# Patient Record
Sex: FEMALE | Race: WHITE | NOT HISPANIC OR LATINO | Employment: OTHER | URBAN - METROPOLITAN AREA
[De-identification: names, ages, dates, MRNs, and addresses within clinical notes are randomized per-mention and may not be internally consistent; named-entity substitution may affect disease eponyms.]

---

## 2017-12-03 ENCOUNTER — APPOINTMENT (OUTPATIENT)
Dept: RADIOLOGY | Facility: MEDICAL CENTER | Age: 82
DRG: 310 | End: 2017-12-03
Attending: FAMILY MEDICINE
Payer: MEDICARE

## 2017-12-03 ENCOUNTER — HOSPITAL ENCOUNTER (INPATIENT)
Facility: MEDICAL CENTER | Age: 82
LOS: 2 days | DRG: 310 | End: 2017-12-05
Attending: EMERGENCY MEDICINE | Admitting: FAMILY MEDICINE
Payer: MEDICARE

## 2017-12-03 ENCOUNTER — RESOLUTE PROFESSIONAL BILLING HOSPITAL PROF FEE (OUTPATIENT)
Dept: HOSPITALIST | Facility: MEDICAL CENTER | Age: 82
End: 2017-12-03
Payer: MEDICARE

## 2017-12-03 ENCOUNTER — APPOINTMENT (OUTPATIENT)
Dept: RADIOLOGY | Facility: MEDICAL CENTER | Age: 82
DRG: 310 | End: 2017-12-03
Attending: EMERGENCY MEDICINE
Payer: MEDICARE

## 2017-12-03 DIAGNOSIS — J81.0 ACUTE PULMONARY EDEMA (HCC): ICD-10-CM

## 2017-12-03 DIAGNOSIS — I48.91 ATRIAL FIBRILLATION WITH RVR (HCC): ICD-10-CM

## 2017-12-03 DIAGNOSIS — I48.91 RAPID ATRIAL FIBRILLATION (HCC): ICD-10-CM

## 2017-12-03 DIAGNOSIS — R53.1 WEAKNESS: ICD-10-CM

## 2017-12-03 LAB
ALBUMIN SERPL BCP-MCNC: 3.8 G/DL (ref 3.2–4.9)
ALBUMIN/GLOB SERPL: 1.5 G/DL
ALP SERPL-CCNC: 60 U/L (ref 30–99)
ALT SERPL-CCNC: 25 U/L (ref 2–50)
ANION GAP SERPL CALC-SCNC: 8 MMOL/L (ref 0–11.9)
APTT PPP: 28.2 SEC (ref 24.7–36)
AST SERPL-CCNC: 43 U/L (ref 12–45)
BASOPHILS # BLD AUTO: 1.2 % (ref 0–1.8)
BASOPHILS # BLD: 0.1 K/UL (ref 0–0.12)
BILIRUB SERPL-MCNC: 1.3 MG/DL (ref 0.1–1.5)
BNP SERPL-MCNC: 439 PG/ML (ref 0–100)
BUN SERPL-MCNC: 19 MG/DL (ref 8–22)
CALCIUM SERPL-MCNC: 8.8 MG/DL (ref 8.5–10.5)
CHLORIDE SERPL-SCNC: 106 MMOL/L (ref 96–112)
CO2 SERPL-SCNC: 25 MMOL/L (ref 20–33)
CREAT SERPL-MCNC: 0.78 MG/DL (ref 0.5–1.4)
EKG IMPRESSION: NORMAL
EOSINOPHIL # BLD AUTO: 0.07 K/UL (ref 0–0.51)
EOSINOPHIL NFR BLD: 0.8 % (ref 0–6.9)
ERYTHROCYTE [DISTWIDTH] IN BLOOD BY AUTOMATED COUNT: 50.4 FL (ref 35.9–50)
GFR SERPL CREATININE-BSD FRML MDRD: >60 ML/MIN/1.73 M 2
GLOBULIN SER CALC-MCNC: 2.6 G/DL (ref 1.9–3.5)
GLUCOSE SERPL-MCNC: 106 MG/DL (ref 65–99)
HCT VFR BLD AUTO: 37.7 % (ref 37–47)
HGB BLD-MCNC: 12.1 G/DL (ref 12–16)
IMM GRANULOCYTES # BLD AUTO: 0.04 K/UL (ref 0–0.11)
IMM GRANULOCYTES NFR BLD AUTO: 0.5 % (ref 0–0.9)
INR PPP: 1.07 (ref 0.87–1.13)
LV EJECT FRACT  99904: 65
LV EJECT FRACT MOD 2C 99903: 63.16
LV EJECT FRACT MOD 4C 99902: 47.4
LV EJECT FRACT MOD BP 99901: 56.26
LYMPHOCYTES # BLD AUTO: 1.49 K/UL (ref 1–4.8)
LYMPHOCYTES NFR BLD: 17.5 % (ref 22–41)
MAGNESIUM SERPL-MCNC: 1.9 MG/DL (ref 1.5–2.5)
MCH RBC QN AUTO: 31.2 PG (ref 27–33)
MCHC RBC AUTO-ENTMCNC: 32.1 G/DL (ref 33.6–35)
MCV RBC AUTO: 97.2 FL (ref 81.4–97.8)
MONOCYTES # BLD AUTO: 0.81 K/UL (ref 0–0.85)
MONOCYTES NFR BLD AUTO: 9.5 % (ref 0–13.4)
NEUTROPHILS # BLD AUTO: 5.99 K/UL (ref 2–7.15)
NEUTROPHILS NFR BLD: 70.5 % (ref 44–72)
NRBC # BLD AUTO: 0 K/UL
NRBC BLD AUTO-RTO: 0 /100 WBC
PLATELET # BLD AUTO: 258 K/UL (ref 164–446)
PMV BLD AUTO: 10.4 FL (ref 9–12.9)
POTASSIUM SERPL-SCNC: 3.8 MMOL/L (ref 3.6–5.5)
PROT SERPL-MCNC: 6.4 G/DL (ref 6–8.2)
PROTHROMBIN TIME: 13.6 SEC (ref 12–14.6)
RBC # BLD AUTO: 3.88 M/UL (ref 4.2–5.4)
SODIUM SERPL-SCNC: 139 MMOL/L (ref 135–145)
T4 FREE SERPL-MCNC: 1.33 NG/DL (ref 0.53–1.43)
TROPONIN I SERPL-MCNC: <0.01 NG/ML (ref 0–0.04)
TSH SERPL DL<=0.005 MIU/L-ACNC: 9.05 UIU/ML (ref 0.3–3.7)
WBC # BLD AUTO: 8.5 K/UL (ref 4.8–10.8)

## 2017-12-03 PROCEDURE — 700111 HCHG RX REV CODE 636 W/ 250 OVERRIDE (IP): Performed by: FAMILY MEDICINE

## 2017-12-03 PROCEDURE — 84439 ASSAY OF FREE THYROXINE: CPT

## 2017-12-03 PROCEDURE — 83880 ASSAY OF NATRIURETIC PEPTIDE: CPT

## 2017-12-03 PROCEDURE — 94760 N-INVAS EAR/PLS OXIMETRY 1: CPT

## 2017-12-03 PROCEDURE — 99285 EMERGENCY DEPT VISIT HI MDM: CPT

## 2017-12-03 PROCEDURE — 85610 PROTHROMBIN TIME: CPT

## 2017-12-03 PROCEDURE — 93306 TTE W/DOPPLER COMPLETE: CPT | Mod: 26 | Performed by: INTERNAL MEDICINE

## 2017-12-03 PROCEDURE — 93005 ELECTROCARDIOGRAM TRACING: CPT | Performed by: EMERGENCY MEDICINE

## 2017-12-03 PROCEDURE — 770020 HCHG ROOM/CARE - TELE (206)

## 2017-12-03 PROCEDURE — 700111 HCHG RX REV CODE 636 W/ 250 OVERRIDE (IP): Performed by: HOSPITALIST

## 2017-12-03 PROCEDURE — 85025 COMPLETE CBC W/AUTO DIFF WBC: CPT

## 2017-12-03 PROCEDURE — 83735 ASSAY OF MAGNESIUM: CPT

## 2017-12-03 PROCEDURE — 700111 HCHG RX REV CODE 636 W/ 250 OVERRIDE (IP): Performed by: INTERNAL MEDICINE

## 2017-12-03 PROCEDURE — A9270 NON-COVERED ITEM OR SERVICE: HCPCS | Performed by: INTERNAL MEDICINE

## 2017-12-03 PROCEDURE — 84484 ASSAY OF TROPONIN QUANT: CPT

## 2017-12-03 PROCEDURE — 84443 ASSAY THYROID STIM HORMONE: CPT

## 2017-12-03 PROCEDURE — 71275 CT ANGIOGRAPHY CHEST: CPT

## 2017-12-03 PROCEDURE — 96376 TX/PRO/DX INJ SAME DRUG ADON: CPT

## 2017-12-03 PROCEDURE — 96375 TX/PRO/DX INJ NEW DRUG ADDON: CPT

## 2017-12-03 PROCEDURE — 700102 HCHG RX REV CODE 250 W/ 637 OVERRIDE(OP): Performed by: FAMILY MEDICINE

## 2017-12-03 PROCEDURE — A9270 NON-COVERED ITEM OR SERVICE: HCPCS | Performed by: FAMILY MEDICINE

## 2017-12-03 PROCEDURE — 700111 HCHG RX REV CODE 636 W/ 250 OVERRIDE (IP): Performed by: EMERGENCY MEDICINE

## 2017-12-03 PROCEDURE — 96374 THER/PROPH/DIAG INJ IV PUSH: CPT

## 2017-12-03 PROCEDURE — 700111 HCHG RX REV CODE 636 W/ 250 OVERRIDE (IP): Performed by: NURSE PRACTITIONER

## 2017-12-03 PROCEDURE — 700117 HCHG RX CONTRAST REV CODE 255: Performed by: FAMILY MEDICINE

## 2017-12-03 PROCEDURE — 93306 TTE W/DOPPLER COMPLETE: CPT

## 2017-12-03 PROCEDURE — 80053 COMPREHEN METABOLIC PANEL: CPT

## 2017-12-03 PROCEDURE — 700101 HCHG RX REV CODE 250: Performed by: FAMILY MEDICINE

## 2017-12-03 PROCEDURE — 93005 ELECTROCARDIOGRAM TRACING: CPT

## 2017-12-03 PROCEDURE — 71010 DX-CHEST-PORTABLE (1 VIEW): CPT

## 2017-12-03 PROCEDURE — 700102 HCHG RX REV CODE 250 W/ 637 OVERRIDE(OP): Performed by: INTERNAL MEDICINE

## 2017-12-03 PROCEDURE — 99221 1ST HOSP IP/OBS SF/LOW 40: CPT | Mod: AI | Performed by: FAMILY MEDICINE

## 2017-12-03 PROCEDURE — 85730 THROMBOPLASTIN TIME PARTIAL: CPT

## 2017-12-03 RX ORDER — POLYETHYLENE GLYCOL 3350 17 G/17G
1 POWDER, FOR SOLUTION ORAL
Status: DISCONTINUED | OUTPATIENT
Start: 2017-12-03 | End: 2017-12-05 | Stop reason: HOSPADM

## 2017-12-03 RX ORDER — ONDANSETRON 4 MG/1
4 TABLET, ORALLY DISINTEGRATING ORAL EVERY 4 HOURS PRN
Status: DISCONTINUED | OUTPATIENT
Start: 2017-12-03 | End: 2017-12-05 | Stop reason: HOSPADM

## 2017-12-03 RX ORDER — LEVOTHYROXINE SODIUM 0.07 MG/1
75 TABLET ORAL
Status: DISCONTINUED | OUTPATIENT
Start: 2017-12-04 | End: 2017-12-05 | Stop reason: HOSPADM

## 2017-12-03 RX ORDER — PAROXETINE 10 MG/1
10 TABLET, FILM COATED ORAL DAILY
Status: DISCONTINUED | OUTPATIENT
Start: 2017-12-03 | End: 2017-12-05 | Stop reason: HOSPADM

## 2017-12-03 RX ORDER — LEVOTHYROXINE SODIUM 0.07 MG/1
75 TABLET ORAL
COMMUNITY

## 2017-12-03 RX ORDER — FUROSEMIDE 10 MG/ML
20 INJECTION INTRAMUSCULAR; INTRAVENOUS ONCE
Status: COMPLETED | OUTPATIENT
Start: 2017-12-03 | End: 2017-12-03

## 2017-12-03 RX ORDER — OXYCODONE HYDROCHLORIDE 5 MG/1
2.5 TABLET ORAL
Status: DISCONTINUED | OUTPATIENT
Start: 2017-12-03 | End: 2017-12-05 | Stop reason: HOSPADM

## 2017-12-03 RX ORDER — PAROXETINE 10 MG/1
10 TABLET, FILM COATED ORAL DAILY
COMMUNITY

## 2017-12-03 RX ORDER — DILTIAZEM HYDROCHLORIDE 5 MG/ML
10 INJECTION INTRAVENOUS ONCE
Status: COMPLETED | OUTPATIENT
Start: 2017-12-03 | End: 2017-12-03

## 2017-12-03 RX ORDER — DIGOXIN 0.25 MG/ML
250 INJECTION INTRAMUSCULAR; INTRAVENOUS
Status: DISCONTINUED | OUTPATIENT
Start: 2017-12-03 | End: 2017-12-03

## 2017-12-03 RX ORDER — ACETAMINOPHEN 325 MG/1
650 TABLET ORAL EVERY 6 HOURS PRN
Status: DISCONTINUED | OUTPATIENT
Start: 2017-12-03 | End: 2017-12-05 | Stop reason: HOSPADM

## 2017-12-03 RX ORDER — METOPROLOL TARTRATE 1 MG/ML
5 INJECTION, SOLUTION INTRAVENOUS
Status: DISCONTINUED | OUTPATIENT
Start: 2017-12-03 | End: 2017-12-05 | Stop reason: HOSPADM

## 2017-12-03 RX ORDER — DIGOXIN 0.25 MG/ML
250 INJECTION INTRAMUSCULAR; INTRAVENOUS ONCE
Status: COMPLETED | OUTPATIENT
Start: 2017-12-03 | End: 2017-12-03

## 2017-12-03 RX ORDER — DIGOXIN 125 MCG
125 TABLET ORAL DAILY
Status: DISCONTINUED | OUTPATIENT
Start: 2017-12-04 | End: 2017-12-05 | Stop reason: HOSPADM

## 2017-12-03 RX ORDER — CHLORAL HYDRATE 500 MG
1 CAPSULE ORAL DAILY
COMMUNITY

## 2017-12-03 RX ORDER — DIGOXIN 0.25 MG/ML
250 INJECTION INTRAMUSCULAR; INTRAVENOUS 3 TIMES DAILY PRN
Status: DISCONTINUED | OUTPATIENT
Start: 2017-12-03 | End: 2017-12-05 | Stop reason: HOSPADM

## 2017-12-03 RX ORDER — BISACODYL 10 MG
10 SUPPOSITORY, RECTAL RECTAL
Status: DISCONTINUED | OUTPATIENT
Start: 2017-12-03 | End: 2017-12-05 | Stop reason: HOSPADM

## 2017-12-03 RX ORDER — POTASSIUM CHLORIDE 20 MEQ/1
40 TABLET, EXTENDED RELEASE ORAL ONCE
Status: COMPLETED | OUTPATIENT
Start: 2017-12-03 | End: 2017-12-03

## 2017-12-03 RX ORDER — MORPHINE SULFATE 4 MG/ML
2 INJECTION, SOLUTION INTRAMUSCULAR; INTRAVENOUS
Status: DISCONTINUED | OUTPATIENT
Start: 2017-12-03 | End: 2017-12-05 | Stop reason: HOSPADM

## 2017-12-03 RX ORDER — CALCIUM CARBONATE 500(1250)
500 TABLET ORAL DAILY
COMMUNITY

## 2017-12-03 RX ORDER — AMOXICILLIN 250 MG
2 CAPSULE ORAL 2 TIMES DAILY
Status: DISCONTINUED | OUTPATIENT
Start: 2017-12-03 | End: 2017-12-05 | Stop reason: HOSPADM

## 2017-12-03 RX ORDER — ONDANSETRON 2 MG/ML
4 INJECTION INTRAMUSCULAR; INTRAVENOUS EVERY 4 HOURS PRN
Status: DISCONTINUED | OUTPATIENT
Start: 2017-12-03 | End: 2017-12-05 | Stop reason: HOSPADM

## 2017-12-03 RX ORDER — BIOTIN 10 MG
1 TABLET ORAL DAILY
COMMUNITY

## 2017-12-03 RX ORDER — OXYCODONE HYDROCHLORIDE 5 MG/1
5 TABLET ORAL
Status: DISCONTINUED | OUTPATIENT
Start: 2017-12-03 | End: 2017-12-05 | Stop reason: HOSPADM

## 2017-12-03 RX ADMIN — DIGOXIN 250 MCG: 0.25 INJECTION INTRAMUSCULAR; INTRAVENOUS at 13:54

## 2017-12-03 RX ADMIN — ENOXAPARIN SODIUM 80 MG: 100 INJECTION SUBCUTANEOUS at 15:51

## 2017-12-03 RX ADMIN — DILTIAZEM HYDROCHLORIDE 10 MG: 5 INJECTION INTRAVENOUS at 11:39

## 2017-12-03 RX ADMIN — IOHEXOL 65 ML: 350 INJECTION, SOLUTION INTRAVENOUS at 19:41

## 2017-12-03 RX ADMIN — POTASSIUM CHLORIDE 40 MEQ: 1500 TABLET, EXTENDED RELEASE ORAL at 21:29

## 2017-12-03 RX ADMIN — DILTIAZEM HYDROCHLORIDE 10 MG: 5 INJECTION INTRAVENOUS at 10:39

## 2017-12-03 RX ADMIN — DILTIAZEM HYDROCHLORIDE 60 MG: 30 TABLET, FILM COATED ORAL at 17:12

## 2017-12-03 RX ADMIN — DIGOXIN 250 MCG: 0.25 INJECTION INTRAMUSCULAR; INTRAVENOUS at 18:14

## 2017-12-03 RX ADMIN — FUROSEMIDE 20 MG: 10 INJECTION, SOLUTION INTRAMUSCULAR; INTRAVENOUS at 21:29

## 2017-12-03 RX ADMIN — DIGOXIN 250 MCG: 0.25 INJECTION INTRAMUSCULAR; INTRAVENOUS at 22:29

## 2017-12-03 RX ADMIN — RIVAROXABAN 20 MG: 20 TABLET, FILM COATED ORAL at 17:12

## 2017-12-03 RX ADMIN — METOPROLOL TARTRATE 5 MG: 5 INJECTION, SOLUTION INTRAVENOUS at 18:55

## 2017-12-03 RX ADMIN — PAROXETINE HYDROCHLORIDE 10 MG: 10 TABLET, FILM COATED ORAL at 21:29

## 2017-12-03 RX ADMIN — METOPROLOL TARTRATE 5 MG: 5 INJECTION, SOLUTION INTRAVENOUS at 14:29

## 2017-12-03 ASSESSMENT — PATIENT HEALTH QUESTIONNAIRE - PHQ9
SUM OF ALL RESPONSES TO PHQ9 QUESTIONS 1 AND 2: 0
2. FEELING DOWN, DEPRESSED, IRRITABLE, OR HOPELESS: NOT AT ALL
SUM OF ALL RESPONSES TO PHQ QUESTIONS 1-9: 0
1. LITTLE INTEREST OR PLEASURE IN DOING THINGS: NOT AT ALL

## 2017-12-03 ASSESSMENT — COGNITIVE AND FUNCTIONAL STATUS - GENERAL
DAILY ACTIVITIY SCORE: 24
SUGGESTED CMS G CODE MODIFIER DAILY ACTIVITY: CH
CLIMB 3 TO 5 STEPS WITH RAILING: A LITTLE
SUGGESTED CMS G CODE MODIFIER MOBILITY: CI
MOBILITY SCORE: 23

## 2017-12-03 ASSESSMENT — PAIN SCALES - GENERAL
PAINLEVEL_OUTOF10: 0

## 2017-12-03 ASSESSMENT — LIFESTYLE VARIABLES
TOTAL SCORE: 0
EVER HAD A DRINK FIRST THING IN THE MORNING TO STEADY YOUR NERVES TO GET RID OF A HANGOVER: NO
TOTAL SCORE: 0
TOTAL SCORE: 0
CONSUMPTION TOTAL: NEGATIVE
EVER_SMOKED: YES
EVER FELT BAD OR GUILTY ABOUT YOUR DRINKING: NO
HOW MANY TIMES IN THE PAST YEAR HAVE YOU HAD 5 OR MORE DRINKS IN A DAY: 0
ON A TYPICAL DAY WHEN YOU DRINK ALCOHOL HOW MANY DRINKS DO YOU HAVE: 1
AVERAGE NUMBER OF DAYS PER WEEK YOU HAVE A DRINK CONTAINING ALCOHOL: 1
HAVE PEOPLE ANNOYED YOU BY CRITICIZING YOUR DRINKING: NO
HAVE YOU EVER FELT YOU SHOULD CUT DOWN ON YOUR DRINKING: NO
ALCOHOL_USE: YES

## 2017-12-03 NOTE — PROGRESS NOTES
Patient up from ED, report received. Patient ambulated from rney to bed tolerated well, slightly DSOUZA. Patient placed on tele monitor, monitor room notified. Vital signs obtained, HR elevated. Physical assessment performed. Patient is A&O X 4, declines pain at this time. Patient oriented to room and unit routine. Admit profile complete. Patient declines further needs at this time. Will continue to monitor for safety and comfort.

## 2017-12-03 NOTE — H&P
DOS: 12/3/2017    PATIENT ID:  NAME:  Valerie Ulloa  MRN:               [id number]  YOB: 1932    PMD: No primary care provider on file.    CC: Generalized weakness    HPI: Valerie Ulloa is a 85 y.o. female who presents with generalized weakness and fatigue for the past 3 days. She flew in from [place] to [place] and doing the pelvic bowel dilatation her son so 50 of birthday. He has a house there. She denies having chest pain or palpitations shortness of breath. Denies any fevers chills or cough or congestion nausea vomiting diarrhea. Her symptoms of weakness and fatigue were persistent and there was supposed to fly out today they were ready in the airport when she got more week. She was brought to emergency room she is known to be atrial fibrillation with rapid ventricular rate with a rate in the 150s. She did use denies any chest pains or palpitations. She states she had an episode of atrial fibrillation for 5 years ago was very transient she was not placed on any rhythm or rate control medications or any anticoagulation since this was just one episode. She'll be given IV diltiazem here twice on her rate still in the 130s, cardiology Dr. Crane has been consulted have discussed the case with her. Plan is to give her some IV digoxin to see if this will control her rate.                REVIEW OF SYSTEMS  A full review of systems was completed and all pertinent positives and negatives are included in the HPI above by AMA/CMS criteria.    PAST MEDICAL HISTORY  Past Medical History:   Diagnosis Date   • Anxiety    • Hypothyroid    • Osteoporosis    • PAF (paroxysmal atrial fibrillation) (CMS-HCC)        PAST SURGICAL HISTORY  No past surgical history on file.    FAMILY HISTORY  No family history on file.    SOCIAL HISTORY  Social History   Substance Use Topics   • Smoking status: Never Smoker   • Smokeless tobacco: Never Used   • Alcohol use No       ALLERGIES  Allergies as of 12/03/2017 -  "Reviewed 12/03/2017   Allergen Reaction Noted   • Pcn [penicillins] Rash 12/03/2017       OUTPATIENT MEDICATIONS     Medication List      ASK your doctor about these medications      Instructions   Biotin 10 MG Tabs   Take 1 Tab by mouth every day.  Dose:  1 Tab     BONIVA IV   by Intravenous route.     calcium carbonate 500 MG Tabs  Commonly known as:  OS-SIRIA 500   Take 500 mg by mouth every day.  Dose:  500 mg     levothyroxine 75 MCG Tabs  Commonly known as:  SYNTHROID   Take 75 mcg by mouth Every morning on an empty stomach.  Dose:  75 mcg     Omega-3 1000 MG Caps   Take 1 Cap by mouth every day.  Dose:  1 Cap     paroxetine 10 MG Tabs  Commonly known as:  PAXIL   Take 10 mg by mouth every day.  Dose:  10 mg            PHYSICAL EXAM:  Pulse (!) 117, temperature 36.9 °C (98.4 °F), resp. rate 16, height 1.6 m (5' 3\"), weight 76.7 kg (169 lb 1.5 oz), SpO2 93 %.  Oxygen: Pulse Oximetry: 93 %, O2 (LPM): 2, O2 Delivery: Nasal Cannula    Gen: NAD, comfortable  HEENT: NCAT, PERRL, EOMI, Oropharynx moist and clear, no LAD, no JVD, neck supple  Chest: no accessory muscle use, CTAB  CV: irregular rate and rhythm, S1 and S2 distinct, no murmur  GI: +BS, soft, NT, ND, no rebound/guarding, no hepatosplenomegaly  Extremities: Warm, well-perfused, no cyanosis/clubbing, no peripheral edema, distal pulses are intact  Neuro: AO x 3, CN II-XII grossly intact, MMT 5/5, no sensory deficits    LAB TESTS and IMAGES:   Recent Labs      12/03/17   1009   WBC  8.5   RBC  3.88*   HEMOGLOBIN  12.1   HEMATOCRIT  37.7   MCV  97.2   MCH  31.2   RDW  50.4*   PLATELETCT  258   MPV  10.4   NEUTSPOLYS  70.50   LYMPHOCYTES  17.50*   MONOCYTES  9.50   EOSINOPHILS  0.80   BASOPHILS  1.20     Recent Labs      12/03/17   1009   TROPONINI  <0.01   BNPBTYPENAT  439*     Recent Labs      12/03/17   1009   APTT  28.2   INR  1.07     Recent Labs      12/03/17   1009   SODIUM  139   POTASSIUM  3.8   CHLORIDE  106   CO2  25   BUN  19   CREATININE  0.78 "   CALCIUM  8.8   ALBUMIN  3.8     Estimated GFR/CRCL = Estimated Creatinine Clearance: 51.7 mL/min (by C-G formula based on SCr of 0.78 mg/dL).  Recent Labs      12/03/17   1009   GLUCOSE  106*     Free T-4   Date/Time Value Ref Range Status   12/03/2017 10:09 AM 1.33 0.53 - 1.43 ng/dL Final     Recent Labs      12/03/17   1009   ASTSGOT  43   ALTSGPT  25   TBILIRUBIN  1.3   ALKPHOSPHAT  60   GLOBULIN  2.6   INR  1.07           Invalid input(s): [id number]  No results found for: [id number], FOLATE, FERRITIN, IRON, TOTIRONBC  Dx-chest-portable (1 View)    Result Date: 12/3/2017  12/3/2017 10:39 AM HISTORY/REASON FOR EXAM:  Shortness of Breath TECHNIQUE/EXAM DESCRIPTION AND NUMBER OF VIEWS: Single portable view of the chest. COMPARISON: None available. FINDINGS: Lungs are hyperinflated. The mediastinal and cardiac silhouette is mildly enlarged. The pulmonary vascularity is within normal limits. There is diffuse interstitial prominence with bronchial wall thickening. There is no significant pleural effusion. There is no visible pneumothorax. There are no acute bony abnormalities.     1.  Enlarged cardiac silhouette with diffuse interstitial prominence suggesting interstitial edema. There may be some superimposed chronic lung disease.      ASSESSMENT/PLAN:     1.  Atrial fibrillation rapid ventricular rate. IV diltiazem × 2 have been given in ED rate still in the 130s, IV digoxin will be tried, also put on oral Cardizem, IV metoprolol as needed, start her on Lovenox full dose twice daily, check echocardiogram, cardiology Dr. Crane will see the patient.  2.  Hypothyroid. Continue levothyroxine, will would not increase her dose for now given her TSH is somewhat elevated  3.  Anxiety. Continue Paxil  4.  Elevated BNP. Check Echocardiogram    PPX: Lovenox    CODE STATUS: FULL    Anticipate that patient will need more than 2 midnights for management of the above discussed medical issues.    This dictation was created  using voice recognition software. The accuracy of the dictation is limited to the abilities of the software. I expect there may be some errors of grammar and possibly content.

## 2017-12-03 NOTE — ED PROVIDER NOTES
ED Provider Note    CHIEF COMPLAINT  Chief Complaint   Patient presents with   • Atrial Fibrillation   • Shortness of Breath       HPI  Valerie Ulloa is a 85 y.o. female who presentsFeeling short of breath and generally weak.  She was going to the airport today, she felt unsteady and presents to the emergency department.  She has had one previous episode of atrial fibrillation several years ago but never required treatment as it spontaneously resolved prior to needing treatment.  She came here for vacation 4 days ago and has felt short of breath and lightheaded since, however it worsened today.  No chest pain.  No syncope.  No acute leg swelling.  Patient was planning to fly home to [facility] today but came to the hospital instead when she felt sicker.    REVIEW OF SYSTEMS    Constitutional: Generalized weakness, no fever  Respiratory: Shortness of breath with exertion  Cardiac: No palpitations or chest pain  Gastrointestinal: No abdominal pain, no nausea or vomiting  Musculoskeletal: No acute neck or back pain  Neurologic: No headache       All other systems are negative.       PAST MEDICAL HISTORY  Past Medical History:   Diagnosis Date   • Anxiety    • Hypothyroid    • Osteoporosis    • PAF (paroxysmal atrial fibrillation) (CMS-[facility])        FAMILY HISTORY  No family history on file.    SOCIAL HISTORY  Social History     Social History   • Marital status:      Spouse name: N/A   • Number of children: N/A   • Years of education: N/A     Social History Main Topics   • Smoking status: Never Smoker   • Smokeless tobacco: Never Used   • Alcohol use No   • Drug use: No   • Sexual activity: Not on file     Other Topics Concern   • Not on file     Social History Narrative   • No narrative on file       SURGICAL HISTORY  No past surgical history on file.    CURRENT MEDICATIONS  Home Medications     Reviewed by Raegan Prado, Pharmacy Intern (Pharmacy Intern) on 12/03/17 at 1200  Med List  "Status: Complete   Medication Last Dose Status   Biotin 10 MG Tab 12/2/2017 Active   calcium carbonate (CALCIUM CARBONATE) 500 MG Tab 12/2/2017 Active   Ibandronate Sodium (BONIVA IV) 11/19/2017 Active   levothyroxine (SYNTHROID) 75 MCG Tab 12/3/2017 Active   Omega-3 1000 MG Cap 12/2/2017 Active   paroxetine (PAXIL) 10 MG Tab 12/2/2017 Active                ALLERGIES  Allergies   Allergen Reactions   • Pcn [Penicillins] Rash     rash       PHYSICAL EXAM  VITAL SIGNS: Pulse (!) 117   Temp 36.9 °C (98.4 °F)   Resp 16   Ht 1.6 m (5' 3\")   Wt 76.7 kg (169 lb 1.5 oz)   SpO2 93%   BMI 29.95 kg/m²   Constitutional:  Non-toxic appearance.   HENT: No facial swelling  Eyes: Anicteric, no conjunctivitis.     Cardiovascular:Tachycardic heart rate, irregular rhythm  Pulmonary:  No wheezing, No rales.   Gastrointestinal: Soft, No tenderness, No masses  Skin: Warm, Dry, No cyanosis.   Neurologic: Speech is clear, follows commands, facial expression is symmetrical.  Sensation and strength normal  Psychiatric: Affect normal,  Mood normal.  Patient is calm and cooperative  Musculoskeletal: No chest wall tenderness.    EKG/Labs  Results for orders placed or performed during the hospital encounter of 12/03/17   CBC WITH DIFFERENTIAL   Result Value Ref Range    WBC 8.5 4.8 - 10.8 K/uL    RBC 3.88 (L) 4.20 - 5.40 M/uL    Hemoglobin 12.1 12.0 - 16.0 g/dL    Hematocrit 37.7 37.0 - 47.0 %    MCV 97.2 81.4 - 97.8 fL    MCH 31.2 27.0 - 33.0 pg    MCHC 32.1 (L) 33.6 - 35.0 g/dL    RDW 50.4 (H) 35.9 - 50.0 fL    Platelet Count 258 164 - 446 K/uL    MPV 10.4 9.0 - 12.9 fL    Neutrophils-Polys 70.50 44.00 - 72.00 %    Lymphocytes 17.50 (L) 22.00 - 41.00 %    Monocytes 9.50 0.00 - 13.40 %    Eosinophils 0.80 0.00 - 6.90 %    Basophils 1.20 0.00 - 1.80 %    Immature Granulocytes 0.50 0.00 - 0.90 %    Nucleated RBC 0.00 /100 WBC    Neutrophils (Absolute) 5.99 2.00 - 7.15 K/uL    Lymphs (Absolute) 1.49 1.00 - 4.80 K/uL    Monos (Absolute) 0.81 " 0.00 - 0.85 K/uL    Eos (Absolute) 0.07 0.00 - 0.51 K/uL    Baso (Absolute) 0.10 0.00 - 0.12 K/uL    Immature Granulocytes (abs) 0.04 0.00 - 0.11 K/uL    NRBC (Absolute) 0.00 K/uL   COMP METABOLIC PANEL   Result Value Ref Range    Sodium 139 135 - 145 mmol/L    Potassium 3.8 3.6 - 5.5 mmol/L    Chloride 106 96 - 112 mmol/L    Co2 25 20 - 33 mmol/L    Anion Gap 8.0 0.0 - 11.9    Glucose 106 (H) 65 - 99 mg/dL    Bun 19 8 - 22 mg/dL    Creatinine 0.78 0.50 - 1.40 mg/dL    Calcium 8.8 8.5 - 10.5 mg/dL    AST(SGOT) 43 12 - 45 U/L    ALT(SGPT) 25 2 - 50 U/L    Alkaline Phosphatase 60 30 - 99 U/L    Total Bilirubin 1.3 0.1 - 1.5 mg/dL    Albumin 3.8 3.2 - 4.9 g/dL    Total Protein 6.4 6.0 - 8.2 g/dL    Globulin 2.6 1.9 - 3.5 g/dL    A-G Ratio 1.5 g/dL   APTT   Result Value Ref Range    APTT 28.2 24.7 - 36.0 sec   PROTHROMBIN TIME   Result Value Ref Range    PT 13.6 12.0 - 14.6 sec    INR 1.07 0.87 - 1.13   TROPONIN   Result Value Ref Range    Troponin I <0.01 0.00 - 0.04 ng/mL   BTYPE NATRIURETIC PEPTIDE   Result Value Ref Range    B Natriuretic Peptide 439 (H) 0 - 100 pg/mL   TSH   Result Value Ref Range    TSH 9.050 (H) 0.300 - 3.700 uIU/mL   FREE THYROXINE   Result Value Ref Range    Free T-4 1.33 0.53 - 1.43 ng/dL   ESTIMATED GFR   Result Value Ref Range    GFR If African American >60 >60 mL/min/1.73 m 2    GFR If Non African American >60 >60 mL/min/1.73 m 2   EKG (ER)   Result Value Ref Range    Report       [facility] Emergency Dept.    Test Date:  2017  Pt Name:    LASHONDA MCCULLOUGH             Department: ER  MRN:        [id number]                      Room:       RD 10  Gender:     F                            Technician: [id number]  :        [id number]                   Requested By:ER TRIAGE PROTOCOL  Order #:    [id number]                    Reading MD: SCOTT ALDRICH MD    Measurements  Intervals                                Axis  Rate:       146                          P:  WA:                                       QRS:        -28  QRSD:       80                           T:          46  QT:         316  QTc:        493    Interpretive Statements  ATRIAL FIBRILLATION  BORDERLINE LEFT AXIS DEVIATION  BORDERLINE PROLONGED QT INTERVAL  No previous ECG available for comparison    Electronically Signed On 12-3-2017 15:16:06 PST by JOSEPH ALDRICH MD           RADIOLOGY/PROCEDURES  DX-CHEST-PORTABLE (1 VIEW)   Final Result      1.  Enlarged cardiac silhouette with diffuse interstitial prominence suggesting interstitial edema. There may be some superimposed chronic lung disease.      ECHOCARDIOGRAM COMP W/O CONT    (Results Pending)         COURSE & MEDICAL DECISION MAKING  Pertinent Labs & Imaging studies reviewed. (See chart for details)  X-ray as well as elevated BNP suggests some element of congestive heart failure which is the likely etiology of her shortness of breath.  Her troponin is negative.  Patient received IV diltiazem in the emergency department twice with some reduction in heart rate.  Cardiology has been consulted.  Patient is admitted for ongoing workup and medical stabilization    FINAL IMPRESSION     1. Rapid atrial fibrillation (CMS-HCC)    2. Weakness    3. Acute pulmonary edema (CMS-HCC)         Critical care time 32 minutes           Electronically signed by: Joseph Aldrich, 12/3/2017 3:16 PM

## 2017-12-03 NOTE — ED NOTES
Pt bib ems from airport. Pt flying back to [place]. Pt SOB, hx of afib but not on meds and only 1 episode in the past of afib. Pt currently in afib.

## 2017-12-03 NOTE — PROGRESS NOTES
2 RN skin check. Bilateral lower extremity swelling, bilateral feet dry, flaky. Skin is otherwise intact.

## 2017-12-04 ENCOUNTER — PATIENT OUTREACH (OUTPATIENT)
Dept: HEALTH INFORMATION MANAGEMENT | Facility: OTHER | Age: 82
End: 2017-12-04

## 2017-12-04 PROBLEM — R79.89 ELEVATED BRAIN NATRIURETIC PEPTIDE (BNP) LEVEL: Status: ACTIVE | Noted: 2017-12-04

## 2017-12-04 PROBLEM — R79.89 ELEVATED BRAIN NATRIURETIC PEPTIDE (BNP) LEVEL: Status: RESOLVED | Noted: 2017-12-04 | Resolved: 2017-12-04

## 2017-12-04 PROBLEM — E03.9 HYPOTHYROID: Status: ACTIVE | Noted: 2017-12-04

## 2017-12-04 PROBLEM — F41.9 ANXIETY: Status: ACTIVE | Noted: 2017-12-04

## 2017-12-04 LAB
ANION GAP SERPL CALC-SCNC: 8 MMOL/L (ref 0–11.9)
BASOPHILS # BLD AUTO: 0.8 % (ref 0–1.8)
BASOPHILS # BLD: 0.08 K/UL (ref 0–0.12)
BUN SERPL-MCNC: 15 MG/DL (ref 8–22)
CALCIUM SERPL-MCNC: 9 MG/DL (ref 8.5–10.5)
CHLORIDE SERPL-SCNC: 105 MMOL/L (ref 96–112)
CO2 SERPL-SCNC: 27 MMOL/L (ref 20–33)
CREAT SERPL-MCNC: 0.74 MG/DL (ref 0.5–1.4)
EKG IMPRESSION: NORMAL
EOSINOPHIL # BLD AUTO: 0.02 K/UL (ref 0–0.51)
EOSINOPHIL NFR BLD: 0.2 % (ref 0–6.9)
ERYTHROCYTE [DISTWIDTH] IN BLOOD BY AUTOMATED COUNT: 49.5 FL (ref 35.9–50)
GFR SERPL CREATININE-BSD FRML MDRD: >60 ML/MIN/1.73 M 2
GLUCOSE SERPL-MCNC: 110 MG/DL (ref 65–99)
HCT VFR BLD AUTO: 37.9 % (ref 37–47)
HGB BLD-MCNC: 11.9 G/DL (ref 12–16)
IMM GRANULOCYTES # BLD AUTO: 0.04 K/UL (ref 0–0.11)
IMM GRANULOCYTES NFR BLD AUTO: 0.4 % (ref 0–0.9)
LYMPHOCYTES # BLD AUTO: 1.32 K/UL (ref 1–4.8)
LYMPHOCYTES NFR BLD: 13.3 % (ref 22–41)
MCH RBC QN AUTO: 30.4 PG (ref 27–33)
MCHC RBC AUTO-ENTMCNC: 31.4 G/DL (ref 33.6–35)
MCV RBC AUTO: 96.7 FL (ref 81.4–97.8)
MONOCYTES # BLD AUTO: 0.95 K/UL (ref 0–0.85)
MONOCYTES NFR BLD AUTO: 9.6 % (ref 0–13.4)
NEUTROPHILS # BLD AUTO: 7.52 K/UL (ref 2–7.15)
NEUTROPHILS NFR BLD: 75.7 % (ref 44–72)
NRBC # BLD AUTO: 0 K/UL
NRBC BLD AUTO-RTO: 0 /100 WBC
PLATELET # BLD AUTO: 248 K/UL (ref 164–446)
PMV BLD AUTO: 10.7 FL (ref 9–12.9)
POTASSIUM SERPL-SCNC: 4.2 MMOL/L (ref 3.6–5.5)
RBC # BLD AUTO: 3.92 M/UL (ref 4.2–5.4)
SODIUM SERPL-SCNC: 140 MMOL/L (ref 135–145)
WBC # BLD AUTO: 9.9 K/UL (ref 4.8–10.8)

## 2017-12-04 PROCEDURE — A9270 NON-COVERED ITEM OR SERVICE: HCPCS | Performed by: INTERNAL MEDICINE

## 2017-12-04 PROCEDURE — A9270 NON-COVERED ITEM OR SERVICE: HCPCS | Performed by: FAMILY MEDICINE

## 2017-12-04 PROCEDURE — 93010 ELECTROCARDIOGRAM REPORT: CPT | Performed by: INTERNAL MEDICINE

## 2017-12-04 PROCEDURE — 700111 HCHG RX REV CODE 636 W/ 250 OVERRIDE (IP): Performed by: NURSE PRACTITIONER

## 2017-12-04 PROCEDURE — 85025 COMPLETE CBC W/AUTO DIFF WBC: CPT

## 2017-12-04 PROCEDURE — A9270 NON-COVERED ITEM OR SERVICE: HCPCS | Performed by: NURSE PRACTITIONER

## 2017-12-04 PROCEDURE — 700102 HCHG RX REV CODE 250 W/ 637 OVERRIDE(OP): Performed by: INTERNAL MEDICINE

## 2017-12-04 PROCEDURE — 770020 HCHG ROOM/CARE - TELE (206)

## 2017-12-04 PROCEDURE — 93005 ELECTROCARDIOGRAM TRACING: CPT | Performed by: INTERNAL MEDICINE

## 2017-12-04 PROCEDURE — 80048 BASIC METABOLIC PNL TOTAL CA: CPT

## 2017-12-04 PROCEDURE — 36415 COLL VENOUS BLD VENIPUNCTURE: CPT

## 2017-12-04 PROCEDURE — 700102 HCHG RX REV CODE 250 W/ 637 OVERRIDE(OP): Performed by: NURSE PRACTITIONER

## 2017-12-04 PROCEDURE — 99239 HOSP IP/OBS DSCHRG MGMT >30: CPT | Performed by: HOSPITALIST

## 2017-12-04 PROCEDURE — 700102 HCHG RX REV CODE 250 W/ 637 OVERRIDE(OP): Performed by: FAMILY MEDICINE

## 2017-12-04 RX ORDER — FUROSEMIDE 10 MG/ML
20 INJECTION INTRAMUSCULAR; INTRAVENOUS ONCE
Status: COMPLETED | OUTPATIENT
Start: 2017-12-04 | End: 2017-12-04

## 2017-12-04 RX ORDER — DIGOXIN 125 MCG
125 TABLET ORAL DAILY
Qty: 30 TAB | Refills: 1 | Status: SHIPPED | OUTPATIENT
Start: 2017-12-04

## 2017-12-04 RX ORDER — DILTIAZEM HYDROCHLORIDE 60 MG/1
60 TABLET, FILM COATED ORAL EVERY 6 HOURS
Qty: 120 TAB | Refills: 1 | Status: SHIPPED | OUTPATIENT
Start: 2017-12-04

## 2017-12-04 RX ADMIN — LEVOTHYROXINE SODIUM 75 MCG: 75 TABLET ORAL at 05:46

## 2017-12-04 RX ADMIN — PAROXETINE HYDROCHLORIDE 10 MG: 10 TABLET, FILM COATED ORAL at 20:45

## 2017-12-04 RX ADMIN — DIGOXIN 125 MCG: 125 TABLET ORAL at 18:34

## 2017-12-04 RX ADMIN — DILTIAZEM HYDROCHLORIDE 60 MG: 30 TABLET, FILM COATED ORAL at 05:46

## 2017-12-04 RX ADMIN — DILTIAZEM HYDROCHLORIDE 60 MG: 30 TABLET, FILM COATED ORAL at 00:09

## 2017-12-04 RX ADMIN — FUROSEMIDE 20 MG: 10 INJECTION, SOLUTION INTRAMUSCULAR; INTRAVENOUS at 10:10

## 2017-12-04 RX ADMIN — RIVAROXABAN 20 MG: 20 TABLET, FILM COATED ORAL at 18:10

## 2017-12-04 RX ADMIN — DILTIAZEM HYDROCHLORIDE 60 MG: 30 TABLET, FILM COATED ORAL at 14:03

## 2017-12-04 ASSESSMENT — ENCOUNTER SYMPTOMS
CHILLS: 0
ABDOMINAL PAIN: 1
FOCAL WEAKNESS: 0
HEADACHES: 0
COUGH: 0
SHORTNESS OF BREATH: 1
WEAKNESS: 1
BLOOD IN STOOL: 0
NERVOUS/ANXIOUS: 1
WEIGHT LOSS: 0
DEPRESSION: 0
NAUSEA: 0
LOSS OF CONSCIOUSNESS: 0
WEAKNESS: 0
DEPRESSION: 1
EYE PAIN: 0
DIZZINESS: 0
PND: 0
HALLUCINATIONS: 0
SHORTNESS OF BREATH: 0
VOMITING: 0
ABDOMINAL PAIN: 0
BLURRED VISION: 0
SORE THROAT: 0
MYALGIAS: 0
EYE DISCHARGE: 0
FALLS: 0
CLAUDICATION: 0
WHEEZING: 0
BRUISES/BLEEDS EASILY: 0
DOUBLE VISION: 0
MYALGIAS: 1
HEMOPTYSIS: 0
ORTHOPNEA: 0
STRIDOR: 0
PALPITATIONS: 0
FEVER: 0
SENSORY CHANGE: 0
SPEECH CHANGE: 0
SINUS PAIN: 0

## 2017-12-04 ASSESSMENT — PAIN SCALES - GENERAL
PAINLEVEL_OUTOF10: 0

## 2017-12-04 NOTE — DISCHARGE INSTRUCTIONS
Discharge Instructions    Discharged to home by car with relative. Discharged via wheelchair, hospital escort: Yes.  Special equipment needed: Not Applicable    Be sure to schedule a follow-up appointment with your primary care doctor or any specialists as instructed.     Discharge Plan:   Diet Plan: Discussed  Activity Level: Discussed  Confirmed Follow up Appointment: Patient to Call and Schedule Appointment  Confirmed Symptoms Management: Discussed  Medication Reconciliation Updated: Yes  Influenza Vaccine Indication: Patient Refuses    I understand that a diet low in cholesterol, fat, and sodium is recommended for good health. Unless I have been given specific instructions below for another diet, I accept this instruction as my diet prescription.   Other diet: 2 G sodium, low cholesterol diet.    Special Instructions: None    · Is patient discharged on Warfarin / Coumadin?   No     · Is patient Post Blood Transfusion?  No    Depression / Suicide Risk    As you are discharged from this [facility] facility, it is important to learn how to keep safe from harming yourself.    Recognize the warning signs:  · Abrupt changes in personality, positive or negative- including increase in energy   · Giving away possessions  · Change in eating patterns- significant weight changes-  positive or negative  · Change in sleeping patterns- unable to sleep or sleeping all the time   · Unwillingness or inability to communicate  · Depression  · Unusual sadness, discouragement and loneliness  · Talk of wanting to die  · Neglect of personal appearance   · Rebelliousness- reckless behavior  · Withdrawal from people/activities they love  · Confusion- inability to concentrate     If you or a loved one observes any of these behaviors or has concerns about self-harm, here's what you can do:  · Talk about it- your feelings and reasons for harming yourself  · Remove any means that you might use to hurt yourself (examples: pills, rope,  extension cords, firearm)  · Get professional help from the community (Mental Health, Substance Abuse, psychological counseling)  · Do not be alone:Call your Safe Contact- someone whom you trust who will be there for you.  · Call your local CRISIS HOTLINE 050-0141 or 072-960-5926  · Call your local Children's Mobile Crisis Response Team [place] (832) 932-7930 or www.[employer]  · Call the toll free National Suicide Prevention Hotlines   · National Suicide Prevention Lifeline 527-864-HUFQ (5914)  · [employer] Hope Line Network 800-SUICIDE (942-7766)  Atrial Fibrillation  Atrial fibrillation is a type of irregular heart rhythm (arrhythmia). During atrial fibrillation, the upper chambers of the heart (atria) quiver continuously in a chaotic pattern. This causes an irregular and often rapid heart rate.   Atrial fibrillation is the result of the heart becoming overloaded with disorganized signals that tell it to beat. These signals are normally released one at a time by a part of the right atrium called the sinoatrial node. They then travel from the atria to the lower chambers of the heart (ventricles), causing the atria and ventricles to contract and pump blood as they pass. In atrial fibrillation, parts of the atria outside of the sinoatrial node also release these signals. This results in two problems. First, the atria receive so many signals that they do not have time to fully contract. Second, the ventricles, which can only receive one signal at a time, beat irregularly and out of rhythm with the atria.   There are three types of atrial fibrillation:   · Paroxysmal. Paroxysmal atrial fibrillation starts suddenly and stops on its own within a week.  · Persistent. Persistent atrial fibrillation lasts for more than a week. It may stop on its own or with treatment.  · Permanent. Permanent atrial fibrillation does not go away. Episodes of atrial fibrillation may lead to permanent atrial fibrillation.  Atrial  fibrillation can prevent your heart from pumping blood normally. It increases your risk of stroke and can lead to heart failure.   CAUSES   · Heart conditions, including a heart attack, heart failure, coronary artery disease, and heart valve conditions.    · Inflammation of the sac that surrounds the heart (pericarditis).  · Blockage of an artery in the lungs (pulmonary embolism).  · Pneumonia or other infections.  · Chronic lung disease.  · Thyroid problems, especially if the thyroid is overactive (hyperthyroidism).  · Caffeine, excessive alcohol use, and use of some illegal drugs.    · Use of some medicines, including certain decongestants and diet pills.  · Heart surgery.    · Birth defects.    Sometimes, no cause can be found. When this happens, the atrial fibrillation is called lone atrial fibrillation. The risk of complications from atrial fibrillation increases if you have lone atrial fibrillation and you are age 60 years or older.  RISK FACTORS  · Heart failure.  · Coronary artery disease.  · Diabetes mellitus.    · High blood pressure (hypertension).    · Obesity.    · Other arrhythmias.    · Increased age.  SIGNS AND SYMPTOMS   · A feeling that your heart is beating rapidly or irregularly.    · A feeling of discomfort or pain in your chest.    · Shortness of breath.    · Sudden light-headedness or weakness.    · Getting tired easily when exercising.    · Urinating more often than normal (mainly when atrial fibrillation first begins).    In paroxysmal atrial fibrillation, symptoms may start and suddenly stop.  DIAGNOSIS   Your health care provider may be able to detect atrial fibrillation when taking your pulse. Your health care provider may have you take a test called an ambulatory electrocardiogram (ECG). An ECG records your heartbeat patterns over a 24-hour period. You may also have other tests, such as:  · Transthoracic echocardiogram (TTE). During echocardiography, sound waves are used to evaluate how  blood flows through your heart.  · Transesophageal echocardiogram (CARLOS).  · Stress test. There is more than one type of stress test. If a stress test is needed, ask your health care provider about which type is best for you.  · Chest X-ray exam.  · Blood tests.  · Computed tomography (CT).  TREATMENT   Treatment may include:  · Treating any underlying conditions. For example, if you have an overactive thyroid, treating the condition may correct atrial fibrillation.  · Taking medicine. Medicines may be given to control a rapid heart rate or to prevent blood clots, heart failure, or a stroke.  · Having a procedure to correct the rhythm of the heart:  ¨ Electrical cardioversion. During electrical cardioversion, a controlled, low-energy shock is delivered to the heart through your skin. If you have chest pain, very low blood pressure, or sudden heart failure, this procedure may need to be done as an emergency.  ¨ Catheter ablation. During this procedure, heart tissues that send the signals that cause atrial fibrillation are destroyed.  ¨ Surgical ablation. During this surgery, thin lines of heart tissue that carry the abnormal signals are destroyed. This procedure can either be an open-heart surgery or a minimally invasive surgery. With the minimally invasive surgery, small cuts are made to access the heart instead of a large opening.  ¨ Pulmonary venous isolation. During this surgery, tissue around the veins that carry blood from the lungs (pulmonary veins) is destroyed. This tissue is thought to carry the abnormal signals.  HOME CARE INSTRUCTIONS   · Take medicines only as directed by your health care provider. Some medicines can make atrial fibrillation worse or recur.  · If blood thinners were prescribed by your health care provider, take them exactly as directed. Too much blood-thinning medicine can cause bleeding. If you take too little, you will not have the needed protection against stroke and other  problems.  · Perform blood tests at home if directed by your health care provider. Perform blood tests exactly as directed.  · Quit smoking if you smoke.  · Do not drink alcohol.  · Do not drink caffeinated beverages such as coffee, soda, and some teas. You may drink decaffeinated coffee, soda, or tea.    · Maintain a healthy weight. Do not use diet pills unless your health care provider approves. They may make heart problems worse.    · Follow diet instructions as directed by your health care provider.  · Exercise regularly as directed by your health care provider.  · Keep all follow-up visits as directed by your health care provider. This is important.  PREVENTION   The following substances can cause atrial fibrillation to recur:   · Caffeinated beverages.  · Alcohol.  · Certain medicines, especially those used for breathing problems.  · Certain herbs and herbal medicines, such as those containing ephedra or ginseng.  · Illegal drugs, such as cocaine and amphetamines.  Sometimes medicines are given to prevent atrial fibrillation from recurring. Proper treatment of any underlying condition is also important in helping prevent recurrence.   SEEK MEDICAL CARE IF:  · You notice a change in the rate, rhythm, or strength of your heartbeat.  · You suddenly begin urinating more frequently.  · You tire more easily when exerting yourself or exercising.  SEEK IMMEDIATE MEDICAL CARE IF:   · You have chest pain, abdominal pain, sweating, or weakness.  · You feel nauseous.  · You have shortness of breath.  · You suddenly have swollen feet and ankles.  · You feel dizzy.  · Your face or limbs feel numb or weak.  · You have a change in your vision or speech.  MAKE SURE YOU:   · Understand these instructions.  · Will watch your condition.  · Will get help right away if you are not doing well or get worse.     This information is not intended to replace advice given to you by your health care provider. Make sure you discuss any  questions you have with your health care provider.     Document Released: 12/18/2006 Document Revised: 01/08/2016 Document Reviewed: 04/13/2016  [employer] Interactive Patient Education ©2016 [employer].  Rivaroxaban oral tablets  What is this medicine?  RIVAROXABAN (ri va KYLE a ban) is an anticoagulant (blood thinner). It is used to treat blood clots in the lungs or in the veins. It is also used after knee or hip surgeries to prevent blood clots. It is also used to lower the chance of stroke in people with a medical condition called atrial fibrillation.  This medicine may be used for other purposes; ask your health care provider or pharmacist if you have questions.  COMMON BRAND NAME(S): Xarelto  What should I tell my health care provider before I take this medicine?  They need to know if you have any of these conditions:  -bleeding disorders  -bleeding in the brain  -blood in your stools (black or tarry stools) or if you have blood in your vomit  -history of stomach bleeding  -kidney disease  -liver disease  -low blood counts, like low white cell, platelet, or red cell counts  -recent or planned spinal or epidural procedure  -take medicines that treat or prevent blood clots  -an unusual or allergic reaction to rivaroxaban, other medicines, foods, dyes, or preservatives  -pregnant or trying to get pregnant  -breast-feeding  How should I use this medicine?  Take this medicine by mouth with a glass of water. Follow the directions on the prescription label. Take your medicine at regular intervals. Do not take it more often than directed. Do not stop taking except on your doctor's advice.  If you are taking this medicine after hip or knee replacement surgery, take it with or without food.  If you are taking this medicine for atrial fibrillation, take it with your evening meal. If you are taking this medicine to treat blood clots, take it with food at the same time each day. If you are unable to swallow your tablet, you  may crush the tablet and mix it in applesauce. Then, immediately eat the applesauce. You should eat more food right after you eat the applesauce containing the crushed tablet.  Talk to your pediatrician regarding the use of this medicine in children. Special care may be needed.  Overdosage: If you think you've taken too much of this medicine contact a poison control center or emergency room at once.  Overdosage: If you think you have taken too much of this medicine contact a poison control center or emergency room at once.  NOTE: This medicine is only for you. Do not share this medicine with others.  What if I miss a dose?  If you take your medicine once a day and miss a dose, take the missed dose as soon as you remember. If you take your medicine twice a day and miss a dose, take the missed dose immediately. In this instance, 2 tablets may be taken at the same time. The next day you should take 1 tablet twice a day as directed.  What may interact with this medicine?  -aspirin and aspirin-like medicines  -certain antibiotics like erythromycin, azithromycin, and clarithromycin  -certain medicines for fungal infections like ketoconazole and itraconazole  -certain medicines for irregular heart beat like amiodarone, quinidine, dronedarone  -certain medicines for seizures like carbamazepine, phenytoin  -certain medicines that treat or prevent blood clots like warfarin, enoxaparin, and dalteparin   -conivaptan  -diltiazem  -felodipine  -indinavir  -lopinavir; ritonavir  -NSAIDS, medicines for pain and inflammation, like ibuprofen or naproxen  -ranolazine  -rifampin  -ritonavir  -[place]'s wort  -verapamil  This list may not describe all possible interactions. Give your health care provider a list of all the medicines, herbs, non-prescription drugs, or dietary supplements you use. Also tell them if you smoke, drink alcohol, or use illegal drugs. Some items may interact with your medicine.  What should I watch for while  using this medicine?  Do not stop taking this medicine without first talking to your doctor. Stopping this medicine may increase your risk of having a stroke. Be sure to refill your prescription before you run out of medicine.  This medicine may increase your risk to bruise or bleed. Call your doctor or health care professional if you notice any unusual bleeding.  Be careful brushing and flossing your teeth or using a toothpick because you may bleed more easily. If you have any dental work done, tell your dentist you are receiving this medicine.  What side effects may I notice from receiving this medicine?  Side effects that you should report to your doctor or health care professional as soon as possible:  -allergic reactions like skin rash, itching or hives, swelling of the face, lips, or tongue  -back pain  -bloody or black, tarry stools  -changes in vision  -confusion, trouble speaking or understanding  -red or dark-brown urine  -redness, blistering, peeling or loosening of the skin, including inside the mouth  -severe headaches  -spitting up blood or brown material that looks like coffee grounds  -sudden numbness or weakness of the face, arm or leg  -trouble walking, dizziness, loss of balance or coordination  -unusual bruising or bleeding from the eye, gums, or nose   Side effects that usually do not require medical attention (Report these to your doctor or health care professional if they continue or are bothersome.):  -dizziness  -muscle pain  This list may not describe all possible side effects. Call your doctor for medical advice about side effects. You may report side effects to FDA at 6-407-FDA-3796.  Where should I keep my medicine?  Keep out of the reach of children.  Store at room temperature between 15 and 30 degrees C (59 and 86 degrees F). Throw away any unused medicine after the expiration date.  NOTE: This sheet is a summary. It may not cover all possible information. If you have questions about  this medicine, talk to your doctor, pharmacist, or health care provider.  © 2014, Elsevier/[employer]. (3/17/2014 3:32:09 PM)

## 2017-12-04 NOTE — ASSESSMENT & PLAN NOTE
. IV diltiazem × 2 have been given in ED rate still in the 130s, IV digoxin also given  - cardiology consulted, patient on PO dilt and IV dig, plans to dc on oral  - IV metoprolol PRN  - start xarelto  - ECHO with EF 65%  - Patient is from [facility], A.fib controlled today, cardiology is ok with patient being dc on  Digoxin 125mcg qd and diltiazem 60mg q6hrs and xarelto with plans to have her cardiologist in [place] adjust as needed  - she is currently on O2 , will attempt to wean and ambulate her , however patient desats to 86% with ambulation. Will send DME home health referral, face to face done.

## 2017-12-04 NOTE — CONSULTS
Cardiology consultation    Reason for consultation: Atrial fibrillation   asking for consultation: Dr. Mendoza  HPI:    85-year-old woman who is originally from [facility] and has lived for more than 40 years in [place]. Accompanied by her son. Visiting here and had a nice time. However admits for the past week or so thought that she had atrial fibrillation. Might of been longer.     Denies excess alcohol or stressors. No concomitant illnesses fevers chills or trauma.   Had noticed a similar episode about 6 years ago, was transiently on rate control and anticoagulation. Details unknown     Came to the emergency room for palpitations, so she was significant breathlessness. No chest discomfort or swelling    Very rapid atrial fibrillation, received diltiazem  Feels better, particularly on the oxygen      Past Medical History:  has a past medical history of Anxiety; Hypothyroid; Osteoporosis; and PAF (paroxysmal atrial fibrillation) (CMS-HCC).  Past Surgical History:  has no past surgical history on file.  Past Social History:  reports that she has never smoked. She has never used smokeless tobacco. She reports that she does not drink alcohol or use drugs.  Past Family History: No family history on file.  Allergies: Pcn [penicillins]    Current Medications:  Prior to Admission medications    Medication Sig Start Date End Date Taking? Authorizing Provider   levothyroxine (SYNTHROID) 75 MCG Tab Take 75 mcg by mouth Every morning on an empty stomach.   Yes Physician Outpatient   paroxetine (PAXIL) 10 MG Tab Take 10 mg by mouth every day.   Yes Physician Outpatient   Biotin 10 MG Tab Take 1 Tab by mouth every day.   Yes Physician Outpatient   calcium carbonate (CALCIUM CARBONATE) 500 MG Tab Take 500 mg by mouth every day.   Yes Physician Outpatient   [place]-3 1000 MG Cap Take 1 Cap by mouth every day.   Yes Physician Outpatient   Ibandronate Sodium (BONIVA IV) by Intravenous route.   Yes  "Physician Outpatient       Review of Systems:  Review of Systems   Constitutional: Positive for malaise/fatigue. Negative for weight loss.   HENT: Negative for hearing loss and tinnitus.    Eyes: Negative.    Respiratory: Positive for shortness of breath. Negative for cough, sputum production and wheezing.    Cardiovascular: Positive for palpitations. Negative for chest pain, leg swelling and PND.   Gastrointestinal: Negative for abdominal pain, heartburn, nausea and vomiting.   Genitourinary: Negative.    Musculoskeletal: Negative.    Neurological: Negative for dizziness.   Endo/Heme/Allergies: Does not bruise/bleed easily.   Psychiatric/Behavioral: Negative for depression and memory loss. The patient is nervous/anxious. The patient does not have insomnia.    All other systems reviewed and are negative.    Blood pressure 119/92, pulse 87, temperature 36.8 °C (98.3 °F), resp. rate 18, height 1.6 m (5' 3\"), weight 76.7 kg (169 lb 1.5 oz), SpO2 93 %, not currently breastfeeding.    Physical Examination:  Physical Exam   Constitutional: She is oriented to person, place, and time. She appears well-developed and well-nourished.   Looks much anger than stated age   HENT:   Head: Normocephalic and atraumatic.   Eyes: EOM are normal. Pupils are equal, round, and reactive to light. No scleral icterus.   Neck: No JVD present. No thyromegaly present.   Cardiovascular: Intact distal pulses.    No murmur heard.  A. fib in the 120s   Pulmonary/Chest: Breath sounds normal. No respiratory distress.   Abdominal: Bowel sounds are normal. She exhibits no distension. There is no tenderness.   Musculoskeletal: She exhibits no edema or tenderness.   Neurological: She is alert and oriented to person, place, and time. She exhibits normal muscle tone. Coordination normal.   Skin: Skin is warm and dry. No rash noted.   Psychiatric: She has a normal mood and affect. Her behavior is normal.   Vitals reviewed.    Data:    EKG done in the " emergency room and reviewed by me shows rapid atrial fibrillation ischemic changes, normal QRS, one beat of aberrancy    Chest x-ray shows mildly enlarged silhouette with mild changes of interstitial edema  White count 8.5 with a normal differential hemoglobin 12.1 with an MCV of 97 platelets 258 potassium 138 with a normal sodium creatinine of 0.8 normal LFTs troponin negative       Impression:    Persistent atrial fibrillation, duration unknown  Volume overload likely secondary to above    Plan:    Echocardiogram  More aggressive rate control, we will use  dilt PO short-acting and intravenous digoxin, GFR is today normal but I don't think digoxin would be a good long-term outpatient strategy  dilt 180 qam +- dig 125 qd may be good for d/c if echo EF normal    Plan for home in the a.m., she does have an excellent Dr. in [place] if rate control was not the answer she'll see a cardiologist    In my opinion should be quite safe to fly and/or have her son drive as long as she is feeling well, no plans for home oxygen  Michelle Crane  12/3/2017

## 2017-12-04 NOTE — PROGRESS NOTES
Cardiology Progress Note               Author: Blayne To Date & Time created: 2017  9:56 AM     Interval History:  Rate is controlled.  Some tachycardia overnight.    Review of Systems   Constitutional: Negative for chills, fever, malaise/fatigue and weight loss.   HENT: Negative for ear discharge, ear pain, hearing loss and nosebleeds.    Eyes: Negative for blurred vision, double vision, pain and discharge.   Respiratory: Negative for cough and shortness of breath.    Cardiovascular: Negative for chest pain, palpitations, orthopnea, claudication, leg swelling and PND.   Gastrointestinal: Negative for abdominal pain, blood in stool, melena, nausea and vomiting.   Genitourinary: Negative for dysuria and hematuria.   Musculoskeletal: Negative for falls, joint pain and myalgias.   Skin: Negative for itching and rash.   Neurological: Negative for dizziness, sensory change, speech change, loss of consciousness and headaches.   Endo/Heme/Allergies: Negative for environmental allergies. Does not bruise/bleed easily.   Psychiatric/Behavioral: Negative for depression, hallucinations and suicidal ideas.       Physical Exam   Constitutional: She is oriented to person, place, and time. No distress.   HENT:   Head: Normocephalic and atraumatic.   Eyes: Right eye exhibits no discharge. Left eye exhibits no discharge.   Neck: No JVD present.   Cardiovascular: Exam reveals no friction rub.    No murmur heard.  There is presence of an irregularly irregular heartbeats.     Pulmonary/Chest: No respiratory distress.   Abdominal: She exhibits no distension. There is no tenderness.   Musculoskeletal: She exhibits no edema or tenderness.   Neurological: She is alert and oriented to person, place, and time.   Skin: Skin is warm and dry.   Psychiatric: She has a normal mood and affect.   Nursing note and vitals reviewed.      Hemodynamics:  Temp (24hrs), Av.8 °C (98.3 °F), Min:36.3 °C (97.3 °F), Max:37.3 °C (99.1  °F)  Temperature: 37.1 °C (98.7 °F)  Pulse  Av.3  Min: 69  Max: 145Heart Rate (Monitored): (!) 144  Blood Pressure : 117/68, NIBP: 123/91     Respiratory:    Respiration: 20, Pulse Oximetry: 93 %     Work Of Breathing / Effort:  (SOB with exertion)  RLL Breath Sounds: Diminished;Crackles, LLL Breath Sounds: Diminished;Crackles  Fluids:     Weight: 77.8 kg (171 lb 8.3 oz)  GI/Nutrition:  Orders Placed This Encounter   Procedures   • DIET ORDER     Standing Status:   Standing     Number of Occurrences:   1     Order Specific Question:   Diet:     Answer:   Cardiac [6]     Lab Results:  Recent Labs      17   1009  17   0234   WBC  8.5  9.9   RBC  3.88*  3.92*   HEMOGLOBIN  12.1  11.9*   HEMATOCRIT  37.7  37.9   MCV  97.2  96.7   MCH  31.2  30.4   MCHC  32.1*  31.4*   RDW  50.4*  49.5   PLATELETCT  258  248   MPV  10.4  10.7     Recent Labs      17   1009  17   0234   SODIUM  139  140   POTASSIUM  3.8  4.2   CHLORIDE  106  105   CO2  25  27   GLUCOSE  106*  110*   BUN  19  15   CREATININE  0.78  0.74   CALCIUM  8.8  9.0     Recent Labs      17   1009   APTT  28.2   INR  1.07     Recent Labs      17   1009   BNPBTYPENAT  439*     Recent Labs      17   1009   TROPONINI  <0.01   BNPBTYPENAT  439*             Medical Decision Making, by Problem:  Active Hospital Problems    Diagnosis   • Atrial fibrillation with RVR (CMS-HCC) [I48.91]       Plan:  Euvolemic.  No CARLOS and Cardioversion at this time.  Ok to discharge home. Will have cardiologist to follow up.  Continue Xarelto, Diltiazem 60 mg qid and digoxin for now.  Her cardiologist will change accordingly later.    Will sign off at this time, please call us with further questions.  Patient will be followed in the outpatient cardiology clinic for further cardiac care.    Thank you for referring this patient to our cardiology service.    Blayne Goss MD.  [facility].        Reviewed  items::  EKG reviewed, Radiology images reviewed, Labs reviewed and Medications reviewed

## 2017-12-04 NOTE — FACE TO FACE
Face to Face Note  -  Durable Medical Equipment    Roz Salazar M.D. - NPI: [id number]  I certify that this patient is under my care and that they had a durable medical equipment(DME)face to face encounter by myself that meets the physician DME face-to-face encounter requirements with this patient on:    Date of encounter:   Patient:                    MRN:                       YOB: 2017  Valerie Ulloa  [id number]  1/22/1932     The encounter with the patient was in whole, or in part, for the following medical condition, which is the primary reason for durable medical equipment:  Other - A.fib    I certify that, based on my findings, the following durable medical equipment is medically necessary:  Oxygen.    HOME O2 Saturation Measurements:(Values must be present for Home Oxygen orders)  Room air sat at rest: 91  Room air sat with amb: 86  With liters of O2: 0.5, O2 sat at rest with O2: 94  With Liters of O2: 2, O2 sat with amb with O2 : 91  Is the patient mobile?: Yes    My Clinical findings support the need for the above equipment due to:  Hypoxia    Supporting Symptoms: sob, weakness with ambulating

## 2017-12-04 NOTE — CODE DOCUMENTATION
MD Lucius pyle, received orders for STAT CTA to rule out PE. Paged CT, can take her in 10 minutes. RR team to take her down. MD Booker from cardiology paged, stated he will come bedside.

## 2017-12-04 NOTE — DISCHARGE PLANNING
Pt needs portable oxygen concentrator for flight home. Pt/family have agreed to pay to rent a concentrator from [employer]. Choice form faxed to [facility].    SW also faxed Xarelto prescription to [facility]. Awaiting return call regarding prior auth/cost.

## 2017-12-04 NOTE — DISCHARGE SUMMARY
CHIEF COMPLAINT ON ADMISSION  Chief Complaint   Patient presents with   • Atrial Fibrillation   • Shortness of Breath       CODE STATUS  Full Code    HPI & HOSPITAL COURSE  This is a 85 y.o. female here with A.fib in RVR required IV dilt x 2 in the ED and then was placed on IV digoxin and PO Cardizem by cardiology. She is originally from [place] and her cardiologist is there. She was also found to have some vascular congestion in lungs and elevated BNP, CHF? Was started on IV lasix here.Echo with EF 65%. Placed on Xarelto for a.fib. Her rate was better controlled on the meds the next day. Although initially cardiology planned for a CARLOS and Cardioversion but after her improvement and rate control decision was made to discharge her to her home cardiologist for further adjustment.  She was discharged on Diltiazem, Digoxin and Xarelto.     Therefore, she is discharged in good and stable condition with close outpatient follow-up.    SPECIFIC OUTPATIENT FOLLOW-UP  Cardiologist in [place]    DISCHARGE PROBLEM LIST  Active Problems:    Atrial fibrillation with RVR (CMS-HCC) POA: Unknown    Hypothyroid POA: Unknown    Anxiety POA: Unknown  Resolved Problems:    Elevated brain natriuretic peptide (BNP) level POA: Unknown      FOLLOW UP  No future appointments.  Primary Care Physician    Schedule an appointment as soon as possible for a visit  Cardiology referral, symptom management      MEDICATIONS ON DISCHARGE   Valerie Ulloa   Home Medication Instructions AMRCELO:[id number]    Printed on:12/04/17 5441   Medication Information                      Biotin 10 MG Tab  Take 1 Tab by mouth every day.             calcium carbonate (CALCIUM CARBONATE) 500 MG Tab  Take 500 mg by mouth every day.             digoxin (LANOXIN) 125 MCG Tab  Take 1 Tab by mouth every day at 6 PM.             diltiazem (CARDIZEM) 60 MG Tab  Take 1 Tab by mouth every 6 hours.             Ibandronate Sodium (BONIVA IV)  by Intravenous route.              levothyroxine (SYNTHROID) 75 MCG Tab  Take 75 mcg by mouth Every morning on an empty stomach.             Omega-3 1000 MG Cap  Take 1 Cap by mouth every day.             paroxetine (PAXIL) 10 MG Tab  Take 10 mg by mouth every day.             rivaroxaban (XARELTO) 20 MG Tab tablet  Take 1 Tab by mouth with dinner.                 DIET  Orders Placed This Encounter   Procedures   • DIET ORDER     Standing Status:   Standing     Number of Occurrences:   1     Order Specific Question:   Diet:     Answer:   Cardiac [6]       ACTIVITY  As tolerated.  Weight bearing as tolerated      CONSULTATIONS  Cardiology     PROCEDURES  none    LABORATORY  Lab Results   Component Value Date/Time    SODIUM 140 12/04/2017 02:34 AM    POTASSIUM 4.2 12/04/2017 02:34 AM    CHLORIDE 105 12/04/2017 02:34 AM    CO2 27 12/04/2017 02:34 AM    GLUCOSE 110 (H) 12/04/2017 02:34 AM    BUN 15 12/04/2017 02:34 AM    CREATININE 0.74 12/04/2017 02:34 AM        Lab Results   Component Value Date/Time    WBC 9.9 12/04/2017 02:34 AM    HEMOGLOBIN 11.9 (L) 12/04/2017 02:34 AM    HEMATOCRIT 37.9 12/04/2017 02:34 AM    PLATELETCT 248 12/04/2017 02:34 AM        Total time of the discharge process exceeds 36 minutes

## 2017-12-04 NOTE — PROGRESS NOTES
Cardiology Progress Note               Author: Kenna Dominique Date & Time created: 12/4/2017  8:15 AM     Interval History:  85 years old female present with palpitations. She was noted to be in atrial fibrillation. HX: anxiety, hypothyroid, and paroxysmal atrial fibrillation for at least one year not on anticoagulation.    Consultation: afib    12/4/17: overnight had afib with rvr was given IV metoprolol   /72  HR 77 afib     Labs reviewed     Echocardiography Laboratory  12/3/2017  No prior study is available for comparison.   Normal left ventricular size, wall thickness, and systolic function.  Left ventricular ejection fraction is visually estimated to be 65%.  Mildly dilated left atrium.  Aortic sclerosis without stenosis.  Mild aortic insufficiency.  Mild tricuspid regurgitation.  Estimated right ventricular systolic pressure  is 55 mmHg.  Normal pericardium without effusion.    CTA- chest   12/3/2017  1.  Somewhat limited exam secondary to respiratory motion.    2.  No pulmonary embolus is identified.    3.  Small bilateral pleural effusions. Patchy groundglass density is more pronounced in the lower lobes and may be in part secondary to atelectasis. Findings are suggestive of pulmonary edema.    Review of Systems   Constitutional: Negative for malaise/fatigue.   Respiratory: Positive for shortness of breath. Negative for cough and wheezing.    Cardiovascular: Negative for chest pain, palpitations, orthopnea and leg swelling.   Musculoskeletal: Negative for falls.   Neurological: Negative for dizziness, focal weakness, loss of consciousness, weakness and headaches.   Psychiatric/Behavioral: The patient is nervous/anxious.    All other systems reviewed and are negative.      Physical Exam   Constitutional: She is oriented to person, place, and time. She appears well-developed and well-nourished.   HENT:   Head: Normocephalic.   Neck: Normal range of motion. No JVD present.   Cardiovascular: Normal rate  and normal heart sounds.  An irregularly irregular rhythm present.   No murmur heard.  Pulmonary/Chest: Effort normal and breath sounds normal. No respiratory distress. She has no wheezes.   Abdominal: Bowel sounds are normal.   Musculoskeletal: She exhibits no edema or tenderness.   Neurological: She is alert and oriented to person, place, and time.   Skin: Skin is warm and dry.   Psychiatric: Her behavior is normal. Judgment normal.   Nursing note and vitals reviewed.      Hemodynamics:  Temp (24hrs), Av.8 °C (98.2 °F), Min:36.3 °C (97.3 °F), Max:37.3 °C (99.1 °F)  Temperature: 36.6 °C (97.8 °F)  Pulse  Av.8  Min: 69  Max: 145Heart Rate (Monitored): (!) 144  Blood Pressure : 116/72, NIBP: 123/91     Respiratory:    Respiration: 18, Pulse Oximetry: 94 %     Work Of Breathing / Effort:  (SOB with exertion)  RLL Breath Sounds: Diminished;Crackles, LLL Breath Sounds: Diminished;Crackles  Fluids:     Weight: 77.8 kg (171 lb 8.3 oz)  GI/Nutrition:  Orders Placed This Encounter   Procedures   • DIET NPO     Standing Status:   Standing     Number of Occurrences:   1     Order Specific Question:   Restrict to:     Answer:   Strict [1]     Lab Results:  Recent Labs      17   1009  17   0234   WBC  8.5  9.9   RBC  3.88*  3.92*   HEMOGLOBIN  12.1  11.9*   HEMATOCRIT  37.7  37.9   MCV  97.2  96.7   MCH  31.2  30.4   MCHC  32.1*  31.4*   RDW  50.4*  49.5   PLATELETCT  258  248   MPV  10.4  10.7     Recent Labs      17   1009  17   0234   SODIUM  139  140   POTASSIUM  3.8  4.2   CHLORIDE  106  105   CO2  25  27   GLUCOSE  106*  110*   BUN  19  15   CREATININE  0.78  0.74   CALCIUM  8.8  9.0     Recent Labs      17   1009   APTT  28.2   INR  1.07     Recent Labs      17   1009   BNPBTYPENAT  439*     Recent Labs      17   1009   TROPONINI  <0.01   BNPBTYPENAT  439*             Medical Decision Making, by Problem:  Active Hospital Problems    Diagnosis   • Atrial fibrillation  with RVR (CMS-HCC) [I48.91]       Plan:  1. Atrial fibrillation:  - ECHO: ef 65%  - currently in afib, rate control   - ambulated in the hallway with rate up to 90s max.   - continue  Digoxin 125mcg qd and diltiazem 60mg q6hrs   - on oral anticoagulation with xarelto    2. Pulmonary hypertension:  - RVSP 55mmHg  - furosemide 20mg once iV          Quality-Core Measures

## 2017-12-04 NOTE — PROGRESS NOTES
Received report from nightshift RN, assumed care of patient. Patient is A&O x 4, no bed alarm indicated. Patient educated on importance of calling if in need of assistance. Verbalizes understanding. Patient declines pain at this time. Patient updated on plan of care, voices no concerns at this time. Will continue to monitor for safety and comfort.

## 2017-12-04 NOTE — DISCHARGE PLANNING
Pt is planning to DC tomorrow morning in order to catch her flight home.      Per [facility], Oxygen concentrator will be delivered bedside by 1700.     MITCHELL called [facility], pts Xarelto does require Prior Auth. MITCHELL faxed paperwork to Bed Day RN Sarahi and notified her by phone. MITCHELL spoke to Cardiology APN Janny to request Xarelto samples for pt so that she can DC tomorrow morning.     MITCHELL notified pt and son of the above information. Will follow up first thing tomorrow to ensure oxygen and xarelto scripts have been delivered.

## 2017-12-04 NOTE — PROGRESS NOTES
[facility] Hospitalist Progress Note    Date of Service: 2017    Chief Complaint  85 y.o. female admitted 12/3/2017 with A.fib RVR and SOB  Interval Problem Update  This is a 85 y.o. female here with A.fib in RVR required IV dilt x 2 in the ED and then was placed on IV digoxin and PO Cardizem by cardiology. She is originally from [place] and her cardiologist is there. She was also found to have some vascular congestion in lungs and elevated BNP, CHF? Was started on IV lasix here.Echo with EF 65%. Placed on Xarelto for a.fib. Her rate was better controlled on the meds this morning     Although initially cardiology planned for a CARLOS and Cardioversion but after her improvement and rate control decision was made to discharge her to her home cardiologist for further adjustment.    She was ambulated with O2 today since she was requiring O2 which is new for her. She desated to 86% while walking and DME home health referral has been sent to obtain portable concentrator so she can travel with O2 to [place]    .    Consultants/Specialty  cardiology    Disposition  home        Review of Systems   Constitutional: Positive for malaise/fatigue. Negative for chills and fever.   HENT: Positive for congestion. Negative for sinus pain and sore throat.    Eyes: Negative for blurred vision.   Respiratory: Positive for shortness of breath. Negative for cough, hemoptysis and stridor.    Cardiovascular: Negative for chest pain, palpitations and leg swelling.   Gastrointestinal: Positive for abdominal pain. Negative for nausea and vomiting.   Genitourinary: Positive for dysuria. Negative for frequency.   Musculoskeletal: Positive for myalgias.   Neurological: Positive for weakness. Negative for dizziness and headaches.   Psychiatric/Behavioral: Positive for depression.      Physical Exam  Laboratory/Imaging   Hemodynamics  Temp (24hrs), Av.8 °C (98.3 °F), Min:36.3 °C (97.3 °F), Max:37.3 °C (99.1 °F)   Temperature: 37.2 °C (98.9  °F)  Pulse  Av.1  Min: 69  Max: 145   Blood Pressure : (!) 99/54      Respiratory      Respiration: 18, Pulse Oximetry: 91 %     Work Of Breathing / Effort:  (SOB with exertion)  RLL Breath Sounds: Diminished;Crackles, LLL Breath Sounds: Diminished;Crackles    Fluids    Intake/Output Summary (Last 24 hours) at 17 1516  Last data filed at 17 0300   Gross per 24 hour   Intake                0 ml   Output             2300 ml   Net            -2300 ml       Nutrition  Orders Placed This Encounter   Procedures   • DIET ORDER     Standing Status:   Standing     Number of Occurrences:   1     Order Specific Question:   Diet:     Answer:   Cardiac [6]     Physical Exam   Constitutional: She is oriented to person, place, and time. She appears well-developed. No distress.   HENT:   Head: Normocephalic and atraumatic.   Mouth/Throat: Oropharynx is clear and moist.   Eyes: Conjunctivae and EOM are normal. Pupils are equal, round, and reactive to light.   Neck: Normal range of motion. No JVD present. No tracheal deviation present. No thyromegaly present.   Cardiovascular: Normal rate.    Irregular   Pulmonary/Chest: Effort normal and breath sounds normal. No respiratory distress. She has no wheezes.   Abdominal: Soft. There is no tenderness.   Musculoskeletal: Normal range of motion. She exhibits no edema.   Lymphadenopathy:     She has no cervical adenopathy.   Neurological: She is alert and oriented to person, place, and time. She has normal reflexes.   Skin: Skin is warm and dry. She is not diaphoretic.   Psychiatric: She has a normal mood and affect.       Recent Labs      17   1009  17   0234   WBC  8.5  9.9   RBC  3.88*  3.92*   HEMOGLOBIN  12.1  11.9*   HEMATOCRIT  37.7  37.9   MCV  97.2  96.7   MCH  31.2  30.4   MCHC  32.1*  31.4*   RDW  50.4*  49.5   PLATELETCT  258  248   MPV  10.4  10.7     Recent Labs      17   1009  17   0234   SODIUM  139  140   POTASSIUM  3.8  4.2    CHLORIDE  106  105   CO2  25  27   GLUCOSE  106*  110*   BUN  19  15   CREATININE  0.78  0.74   CALCIUM  8.8  9.0     Recent Labs      12/03/17   1009   APTT  28.2   INR  1.07     Recent Labs      12/03/17   1009   BNPBTYPENAT  439*              Assessment/Plan     * Atrial fibrillation with RVR (CMS-HCC)   Assessment & Plan    . IV diltiazem × 2 have been given in ED rate still in the 130s, IV digoxin also given  - cardiology consulted, patient on PO dilt and IV dig, plans to dc on oral  - IV metoprolol PRN  - start xarelto  - ECHO with EF 65%  - Patient is from [facility], A.fib controlled today, cardiology is ok with patient being dc on  Digoxin 125mcg qd and diltiazem 60mg q6hrs and xarelto with plans to have her cardiologist in [place] adjust as needed  - she is currently on O2 , will attempt to wean and ambulate her , however patient desats to 86% with ambulation. Will send DME home health referral, face to face done.        Anxiety   Assessment & Plan      Anxiety. Continue Paxil          Hypothyroid   Assessment & Plan    TSH =9, Ft4 wnl  Continue levothyroxine, will would not increase her dose for now given her TSH is somewhat elevated          ready for discharge if she gets her Portable O2 concentrator and xarelto approved by insurance    Bearden catheter::  No Bearden  DVT: on xarelto.  DVT prophylaxis - mechanical:  SCDs  Ulcer Prophylaxis::  Not indicated

## 2017-12-04 NOTE — PROGRESS NOTES
Called to see patient with AFib RVR; RN has given iv Metoprolol;  Discussed with her : She has PAF at least 1 yr ago not on anticoagulation;She thinks this time was at least 4-7 days ago.  Will plan for CARLOS/Cardioversion tomorrow  Currently on Xarelto  $0 mEq K+ suppl ordered for [facility]  TSH abn -- notified attending; Thx

## 2017-12-04 NOTE — CARE PLAN
Problem: Discharge Barriers/Planning  Goal: Patient's continuum of care needs will be met  Outcome: PROGRESSING AS EXPECTED

## 2017-12-04 NOTE — ASSESSMENT & PLAN NOTE
Echo: Left ventricular ejection fraction is visually estimated to be 65%.  Mildly dilated left atrium.  Lasix continue  Cards on board,

## 2017-12-04 NOTE — ASSESSMENT & PLAN NOTE
TSH =9, Ft4 wnl  Continue levothyroxine, will would not increase her dose for now given her TSH is somewhat elevated

## 2017-12-05 VITALS
SYSTOLIC BLOOD PRESSURE: 124 MMHG | RESPIRATION RATE: 16 BRPM | HEIGHT: 63 IN | BODY MASS INDEX: 28.52 KG/M2 | DIASTOLIC BLOOD PRESSURE: 67 MMHG | HEART RATE: 102 BPM | WEIGHT: 160.94 LBS | OXYGEN SATURATION: 93 % | TEMPERATURE: 98.7 F

## 2017-12-05 PROCEDURE — A9270 NON-COVERED ITEM OR SERVICE: HCPCS | Performed by: FAMILY MEDICINE

## 2017-12-05 PROCEDURE — 700102 HCHG RX REV CODE 250 W/ 637 OVERRIDE(OP): Performed by: FAMILY MEDICINE

## 2017-12-05 RX ADMIN — DILTIAZEM HYDROCHLORIDE 60 MG: 30 TABLET, FILM COATED ORAL at 00:12

## 2017-12-05 RX ADMIN — DILTIAZEM HYDROCHLORIDE 60 MG: 30 TABLET, FILM COATED ORAL at 08:19

## 2017-12-05 RX ADMIN — LEVOTHYROXINE SODIUM 75 MCG: 75 TABLET ORAL at 06:21

## 2017-12-05 ASSESSMENT — PAIN SCALES - GENERAL
PAINLEVEL_OUTOF10: 0

## 2017-12-05 NOTE — PROGRESS NOTES
Discharge prescriptions, instructions, oxygen, and xarelto sample ready to go for am. Will need doctor's note for oxygen on airplane. Will let evening RN know. Flight is scheduled for 10:50 a.m.

## 2017-12-05 NOTE — DISCHARGE PLANNING
Received prior authorization request for Xarelto 20 mg take 1 tab by mouth with dinner. Called [phone] and spoke with Fred. Approval received. Authroization number [id number]. Called and updated MITCHELL Fleming who is to call patient's pharmacy and verify medication goes through now.

## 2017-12-05 NOTE — PROGRESS NOTES
12/4/2017    Xarelto 20 mg outpatient supply delivered to bedside. Patient and her sons counseled. All questions answered.    Santiago Jovel, PharmD

## 2017-12-05 NOTE — PROGRESS NOTES
0700 Upon rounds, No acute distress. Aaox4. Resp even and nonlabored. Bed in lowest and locked position. Side rails up x2. Call light within reach.     0730 Reviewed discharge instructions with pt. She verbalized understanding and rec'd discharge packet, two written prescriptions, and a sample bottle of Xarelto. Pt. Also aware to set up f/u appointments with PCP and cardiologist. No acute distress. Denies any additional teaching or concerns.     0845 Pt. Discharge via w/c with oxygen concentrator. No acute distress.